# Patient Record
Sex: FEMALE | Race: WHITE | URBAN - NONMETROPOLITAN AREA
[De-identification: names, ages, dates, MRNs, and addresses within clinical notes are randomized per-mention and may not be internally consistent; named-entity substitution may affect disease eponyms.]

---

## 2024-04-18 ENCOUNTER — OFFICE VISIT (OUTPATIENT)
Dept: PRIMARY CARE | Facility: CLINIC | Age: 54
End: 2024-04-18
Payer: COMMERCIAL

## 2024-04-18 VITALS
SYSTOLIC BLOOD PRESSURE: 108 MMHG | BODY MASS INDEX: 25.6 KG/M2 | WEIGHT: 135.6 LBS | OXYGEN SATURATION: 97 % | DIASTOLIC BLOOD PRESSURE: 73 MMHG | HEIGHT: 61 IN | HEART RATE: 81 BPM | TEMPERATURE: 97 F

## 2024-04-18 DIAGNOSIS — Z12.11 SPECIAL SCREENING FOR MALIGNANT NEOPLASM OF COLON: ICD-10-CM

## 2024-04-18 DIAGNOSIS — K40.90 UNILATERAL INGUINAL HERNIA WITHOUT OBSTRUCTION OR GANGRENE, RECURRENCE NOT SPECIFIED: ICD-10-CM

## 2024-04-18 DIAGNOSIS — E78.5 HYPERLIPIDEMIA, UNSPECIFIED HYPERLIPIDEMIA TYPE: ICD-10-CM

## 2024-04-18 DIAGNOSIS — R59.9 ENLARGED LYMPH NODE: ICD-10-CM

## 2024-04-18 DIAGNOSIS — Z12.31 ENCOUNTER FOR SCREENING MAMMOGRAM FOR MALIGNANT NEOPLASM OF BREAST: ICD-10-CM

## 2024-04-18 DIAGNOSIS — E27.40 ADRENAL INSUFFICIENCY (MULTI): Primary | ICD-10-CM

## 2024-04-18 PROCEDURE — 99204 OFFICE O/P NEW MOD 45 MIN: CPT | Performed by: FAMILY MEDICINE

## 2024-04-18 ASSESSMENT — PATIENT HEALTH QUESTIONNAIRE - PHQ9
2. FEELING DOWN, DEPRESSED OR HOPELESS: NOT AT ALL
1. LITTLE INTEREST OR PLEASURE IN DOING THINGS: NOT AT ALL
SUM OF ALL RESPONSES TO PHQ9 QUESTIONS 1 AND 2: 0

## 2024-04-18 ASSESSMENT — ENCOUNTER SYMPTOMS
DEPRESSION: 0
LOSS OF SENSATION IN FEET: 0
OCCASIONAL FEELINGS OF UNSTEADINESS: 0

## 2024-04-18 NOTE — PATIENT INSTRUCTIONS
Please call the number that was given to you for the [employer] company to make sure it's covered by your insurance and if it is please do complete it

## 2024-04-18 NOTE — PROGRESS NOTES
"Subjective   Patient ID: Elisa Boo is a 53 y.o. female who presents for New Patient Visit ([facility]) and Adenopathy (Swollen since last January ).    HPI  New patient here to [facility] care   Has noticed mass in her right inguinal area for the past 1.5 years, tender to palpation at times. Has also noticed swollen lymph nodes in her inguinal area as well as swollen glands in her throat for the same amount of time. No fever. Has chronic fatigue. Was told she had adrenal insufficiency and hormonal imbalance. Has not had blood work in years, also has not seen a PCP in the same amount of time.   Overdue for her mammogram and pap smear    Review of Systems  General: no fever  Eyes: no blurry vision  ENT: no sore throat, no ear pain  Resp: no cough, sob or wheezing  Cardio: no chest pain, no palpitations  Abd: no nausea/vomiting  : no dysuria, no increased urinary frequency      /73   Pulse 81   Temp 36.1 °C (97 °F)   Ht 1.56 m (5' 1.42\")   Wt 61.5 kg (135 lb 9.6 oz)   SpO2 97%   BMI 25.27 kg/m²       Objective   Physical Exam  Gen: NAD, alert  Head: normocephalic/atraumatic  Eyes: conjunctivae normal  Ears: canals clear bilaterally, TM normal   Nose: external nose normal   Oropharynx: clear   Resp: Clear to auscultation  CVS: Regular rate and rhythm  Abdomen: soft, NT, ND  Ext: no edema, NT of lower extremities  : 1-2 cm mass in right inguinal area, possible hernia, no lymph node currently palpable  Neuro: gait normal     Assessment/Plan   Problem List Items Addressed This Visit    None  Visit Diagnoses       Adrenal insufficiency (Multi)    -  Primary    Relevant Orders    Cortisol    ACTH    Aldosterone    Hyperlipidemia, unspecified hyperlipidemia type        Relevant Orders    Comprehensive Metabolic Panel    Lipid Panel    TSH with reflex to Free T4 if abnormal    Enlarged lymph node        Relevant Orders    CBC    CT pelvis w and wo IV contrast    Unilateral inguinal hernia without " obstruction or gangrene, recurrence not specified        Relevant Orders    CT pelvis w and wo IV contrast    Encounter for screening mammogram for malignant neoplasm of breast        Relevant Orders    BI mammo bilateral screening tomosynthesis    Special screening for malignant neoplasm of colon        Relevant Orders    Cologuard® colon cancer screening

## 2024-06-25 ENCOUNTER — APPOINTMENT (OUTPATIENT)
Dept: PRIMARY CARE | Facility: CLINIC | Age: 54
End: 2024-06-25
Payer: COMMERCIAL

## 2025-05-18 ENCOUNTER — OFFICE VISIT (OUTPATIENT)
Dept: URGENT CARE | Facility: URGENT CARE | Age: 55
End: 2025-05-18
Payer: COMMERCIAL

## 2025-05-18 ENCOUNTER — ANCILLARY PROCEDURE (OUTPATIENT)
Dept: URGENT CARE | Facility: URGENT CARE | Age: 55
End: 2025-05-18
Payer: COMMERCIAL

## 2025-05-18 VITALS
WEIGHT: 142.86 LBS | RESPIRATION RATE: 18 BRPM | DIASTOLIC BLOOD PRESSURE: 73 MMHG | BODY MASS INDEX: 26.63 KG/M2 | TEMPERATURE: 97.9 F | SYSTOLIC BLOOD PRESSURE: 107 MMHG | HEART RATE: 73 BPM | OXYGEN SATURATION: 97 %

## 2025-05-18 DIAGNOSIS — S29.9XXA RIB INJURY: ICD-10-CM

## 2025-05-18 DIAGNOSIS — S29.9XXA RIB INJURY: Primary | ICD-10-CM

## 2025-05-18 PROCEDURE — 71101 X-RAY EXAM UNILAT RIBS/CHEST: CPT | Mod: LEFT SIDE

## 2025-05-18 RX ORDER — METHOCARBAMOL 500 MG/1
500 TABLET, FILM COATED ORAL 4 TIMES DAILY
Qty: 20 TABLET | Refills: 0 | Status: SHIPPED | OUTPATIENT
Start: 2025-05-18 | End: 2025-05-23

## 2025-05-18 RX ORDER — LIDOCAINE 50 MG/G
1 PATCH TOPICAL DAILY
Qty: 10 PATCH | Refills: 0 | Status: SHIPPED | OUTPATIENT
Start: 2025-05-18 | End: 2025-05-28

## 2025-05-18 NOTE — PATIENT INSTRUCTIONS
You will be called if there is abnormal finding on your chest x-ray.  Follow up with primary care provider for recheck in 3-5 days.  Perform breathing exercises several times a day to ensure deep breathing.  This reduces the risk of pneumonia.  Take ibuprofen, acetaminophen for lidocaine patches as well as robaxin for rib pain. Do not take robaxin while driving or operating heavy machinery, with alcohol or other sedating medications as this medication can cause drowsiness.

## 2025-05-18 NOTE — PROGRESS NOTES
Subjective   Patient ID: Elisa Boo is a 54 y.o. female. They present today with a chief complaint of Injury (Lt anterior lower rib pain and tenderness post injury last night. Pt states her  gave her a bear hug/squeeze trying to help her crack her back and pt felt a pop in lt lower rib area. ).    History of Present Illness  See mdm       History provided by:  Patient   used: No    Injury      Past Medical History  Allergies as of 05/18/2025    (No Known Allergies)       Prescriptions Prior to Admission[1]     Medical History[2]    Surgical History[3]     reports that she has never smoked. She has never used smokeless tobacco. She reports that she does not drink alcohol and does not use drugs.    Review of Systems  Review of Systems   All other systems reviewed and are negative.                                 Objective    Vitals:    05/18/25 1104   BP: 107/73   BP Location: Left arm   Patient Position: Sitting   BP Cuff Size: Adult long   Pulse: 73   Resp: 18   Temp: 36.6 °C (97.9 °F)   TempSrc: Oral   SpO2: 97%   Weight: 64.8 kg (142 lb 13.7 oz)     No LMP recorded (lmp unknown). Patient is perimenopausal.    Physical Exam  Vitals and nursing note reviewed.   Constitutional:       General: She is not in acute distress.     Appearance: Normal appearance. She is normal weight. She is not ill-appearing, toxic-appearing or diaphoretic.      Comments: Well appearing and pleasant   HENT:      Head: Normocephalic and atraumatic.      Mouth/Throat:      Mouth: Mucous membranes are moist.      Pharynx: No oropharyngeal exudate or posterior oropharyngeal erythema.   Eyes:      General: No scleral icterus.        Right eye: No discharge.         Left eye: No discharge.      Extraocular Movements: Extraocular movements intact.      Conjunctiva/sclera: Conjunctivae normal.      Pupils: Pupils are equal, round, and reactive to light.   Cardiovascular:      Rate and Rhythm: Normal rate and  regular rhythm.      Pulses: Normal pulses.      Heart sounds: Normal heart sounds. No murmur heard.     No friction rub. No gallop.   Pulmonary:      Effort: Pulmonary effort is normal. No respiratory distress.      Breath sounds: No wheezing, rhonchi or rales.      Comments: Left anterior rib tenderness along the last 3 ribs.  No flail chest.  No ecchymosis or erythema. Chest CTA  Chest:      Chest wall: Tenderness present.   Abdominal:      General: Abdomen is flat.      Tenderness: There is no abdominal tenderness. There is no guarding or rebound.   Musculoskeletal:         General: Normal range of motion.      Cervical back: Normal range of motion and neck supple. No rigidity or tenderness.   Lymphadenopathy:      Cervical: No cervical adenopathy.   Skin:     General: Skin is warm and dry.      Capillary Refill: Capillary refill takes less than 2 seconds.      Coloration: Skin is not jaundiced or pale.      Findings: No rash.   Neurological:      General: No focal deficit present.      Mental Status: She is alert.      Gait: Gait normal.   Psychiatric:         Mood and Affect: Mood normal.         Behavior: Behavior normal.         Procedures    Point of Care Test & Imaging Results from this visit  No results found for this visit on 05/18/25.   Imaging  XR ribs 2 views left w chest pa or ap  Result Date: 5/18/2025  No evidence of acute cardiopulmonary process or acute osseous abnormality.     MACRO: None   Signed by: Mere Anderson 5/18/2025 12:13 PM Dictation workstation:   [id number]      Cardiology, Vascular, and Other Imaging  No other imaging results found for the past 2 days      Diagnostic study results (if any) were reviewed by Leti Mann PA-C.    Assessment/Plan   Allergies, medications, history, and pertinent labs/EKGs/Imaging reviewed by Leti Mann PA-C.     Medical Decision Making  54-year-old female presents with complaint of left chest wall pain.  She states that her  was  trying to crack her back yesterday but squeezed at the wrong time and hugging position from behind.  She was turned and states she felt a pop in the lower anterior aspect of her chest.  She has had pain in this area since which is worse with deep breathing and some movements.  It is tender to palpation.  Exam as above.  No features of flail chest, pneumothorax, blunt abdominal trauma, respiratory distress or other emergency.  X-ray without acute finding.  Will treat with methocarbamol, lidocaine patches, ibuprofen and acetaminophen for pain control.  Discussed breathing exercises to reduce risk of pneumonia associated with chest wall injuries.  Patient agrees with plan of discharge in good condition as discussed.    Orders and Diagnoses  Diagnoses and all orders for this visit:  Rib injury  -     XR ribs 2 views left w chest pa or ap; Future  -     methocarbamol (Robaxin) 500 mg tablet; Take 1 tablet (500 mg) by mouth 4 times a day for 5 days.  -     lidocaine (Lidoderm) 5 % patch; Place 1 patch over 12 hours on the skin once daily for 10 days. Remove & discard patch within 12 hours or as directed by MD.      Medical Admin Record      Patient disposition: Home    Electronically signed by Leti Mann PA-C  1:12 PM           [1] (Not in a hospital admission)   [2] No past medical history on file.  [3] No past surgical history on file.

## 2025-05-19 ENCOUNTER — TELEPHONE (OUTPATIENT)
Dept: URGENT CARE | Facility: URGENT CARE | Age: 55
End: 2025-05-19